# Patient Record
Sex: FEMALE | Race: BLACK OR AFRICAN AMERICAN | NOT HISPANIC OR LATINO | Employment: OTHER | URBAN - METROPOLITAN AREA
[De-identification: names, ages, dates, MRNs, and addresses within clinical notes are randomized per-mention and may not be internally consistent; named-entity substitution may affect disease eponyms.]

---

## 2022-12-22 ENCOUNTER — NURSE TRIAGE (OUTPATIENT)
Dept: ADMINISTRATIVE | Facility: CLINIC | Age: 47
End: 2022-12-22

## 2022-12-23 NOTE — TELEPHONE ENCOUNTER
Patient called to schedule a Gynecological appointment with the [facility] in [place].     Patient advised to contact the [facility] Scheduling Department to make a Specialty Provider appointment and advised that the O Triage Service is unable to schedule appointments with Specialty Providers. Patient provided with the phone information for scheduling ([phone]). Patient states understanding of care advice.      Reason for Disposition   Requesting regular office appointment    Additional Information   Negative: [1] Caller is not with the adult (patient) AND [2] reporting urgent symptoms   Negative: Lab result questions   Negative: Medication questions   Negative: Caller can't be reached by phone   Negative: Caller has already spoken to PCP or another triager   Negative: RN needs further essential information from caller in order to complete triage    Protocols used: Information Only Call-A-

## 2023-04-13 DIAGNOSIS — R19.00 ABDOMINAL MASS, UNSPECIFIED ABDOMINAL LOCATION: Primary | ICD-10-CM

## 2023-04-17 ENCOUNTER — TELEPHONE (OUTPATIENT)
Dept: HEMATOLOGY/ONCOLOGY | Facility: CLINIC | Age: 48
End: 2023-04-17
Payer: COMMERCIAL

## 2023-04-17 DIAGNOSIS — R19.00 ABDOMINAL MASS, UNSPECIFIED ABDOMINAL LOCATION: Primary | ICD-10-CM

## 2023-04-18 ENCOUNTER — LAB VISIT (OUTPATIENT)
Dept: LAB | Facility: HOSPITAL | Age: 48
End: 2023-04-18
Attending: STUDENT IN AN ORGANIZED HEALTH CARE EDUCATION/TRAINING PROGRAM
Payer: COMMERCIAL

## 2023-04-18 ENCOUNTER — TELEPHONE (OUTPATIENT)
Dept: SURGERY | Facility: CLINIC | Age: 48
End: 2023-04-18
Payer: COMMERCIAL

## 2023-04-18 DIAGNOSIS — R19.00 ABDOMINAL MASS, UNSPECIFIED ABDOMINAL LOCATION: ICD-10-CM

## 2023-04-18 PROCEDURE — 88321 PR  MICROSLIDE CONSULT: ICD-10-PCS | Mod: ,,, | Performed by: PATHOLOGY

## 2023-04-18 PROCEDURE — 88321 CONSLTJ&REPRT SLD PREP ELSWR: CPT | Performed by: PATHOLOGY

## 2023-04-18 PROCEDURE — 88321 CONSLTJ&REPRT SLD PREP ELSWR: CPT | Mod: ,,, | Performed by: PATHOLOGY

## 2023-04-20 ENCOUNTER — TELEPHONE (OUTPATIENT)
Dept: SURGERY | Facility: CLINIC | Age: 48
End: 2023-04-20
Payer: COMMERCIAL

## 2023-04-21 ENCOUNTER — TELEPHONE (OUTPATIENT)
Dept: SURGERY | Facility: CLINIC | Age: 48
End: 2023-04-21
Payer: COMMERCIAL

## 2023-04-24 ENCOUNTER — TELEPHONE (OUTPATIENT)
Dept: SURGERY | Facility: CLINIC | Age: 48
End: 2023-04-24
Payer: COMMERCIAL

## 2023-04-24 LAB
COMMENT: NORMAL
FINAL PATHOLOGIC DIAGNOSIS: NORMAL
Lab: NORMAL

## 2023-05-08 ENCOUNTER — TELEPHONE (OUTPATIENT)
Dept: SURGERY | Facility: CLINIC | Age: 48
End: 2023-05-08
Payer: COMMERCIAL

## 2023-05-09 ENCOUNTER — TELEPHONE (OUTPATIENT)
Dept: SURGERY | Facility: CLINIC | Age: 48
End: 2023-05-09
Payer: COMMERCIAL

## 2023-05-09 NOTE — TELEPHONE ENCOUNTER
----- Message from Luisa Phoenix RN sent at 5/9/2023  7:37 AM CDT -----    ----- Message -----  From: Facundo Aaron  Sent: 5/8/2023   6:15 PM CDT  To: Kymberly Reyes Staff    Type:  Patient Returning Call    Who Called:pt   Who Left Message for Patient:luis aaron  Does the patient know what this is regarding?:pt questions   Would the patient rather a call back or a response via [facility]? Call  Best Call Back Number:[phone]  Additional Information: pt called in regards to having some questions In regards to appts

## 2023-05-15 ENCOUNTER — OFFICE VISIT (OUTPATIENT)
Dept: SURGERY | Facility: CLINIC | Age: 48
End: 2023-05-15
Payer: COMMERCIAL

## 2023-05-15 VITALS
OXYGEN SATURATION: 98 % | BODY MASS INDEX: 20.88 KG/M2 | DIASTOLIC BLOOD PRESSURE: 76 MMHG | SYSTOLIC BLOOD PRESSURE: 149 MMHG | WEIGHT: 117.81 LBS | HEART RATE: 87 BPM | HEIGHT: 63 IN

## 2023-05-15 DIAGNOSIS — R19.09 ABDOMINAL WALL MASS OF SUPRAPUBIC REGION: ICD-10-CM

## 2023-05-15 PROCEDURE — 3078F DIAST BP <80 MM HG: CPT | Mod: CPTII,S$GLB,, | Performed by: STUDENT IN AN ORGANIZED HEALTH CARE EDUCATION/TRAINING PROGRAM

## 2023-05-15 PROCEDURE — 1159F MED LIST DOCD IN RCRD: CPT | Mod: CPTII,S$GLB,, | Performed by: STUDENT IN AN ORGANIZED HEALTH CARE EDUCATION/TRAINING PROGRAM

## 2023-05-15 PROCEDURE — 99205 OFFICE O/P NEW HI 60 MIN: CPT | Mod: S$GLB,,, | Performed by: STUDENT IN AN ORGANIZED HEALTH CARE EDUCATION/TRAINING PROGRAM

## 2023-05-15 PROCEDURE — 99205 PR OFFICE/OUTPT VISIT, NEW, LEVL V, 60-74 MIN: ICD-10-PCS | Mod: S$GLB,,, | Performed by: STUDENT IN AN ORGANIZED HEALTH CARE EDUCATION/TRAINING PROGRAM

## 2023-05-15 PROCEDURE — 3078F PR MOST RECENT DIASTOLIC BLOOD PRESSURE < 80 MM HG: ICD-10-PCS | Mod: CPTII,S$GLB,, | Performed by: STUDENT IN AN ORGANIZED HEALTH CARE EDUCATION/TRAINING PROGRAM

## 2023-05-15 PROCEDURE — 99999 PR PBB SHADOW E&M-EST. PATIENT-LVL IV: CPT | Mod: PBBFAC,,, | Performed by: STUDENT IN AN ORGANIZED HEALTH CARE EDUCATION/TRAINING PROGRAM

## 2023-05-15 PROCEDURE — 1160F PR REVIEW ALL MEDS BY PRESCRIBER/CLIN PHARMACIST DOCUMENTED: ICD-10-PCS | Mod: CPTII,S$GLB,, | Performed by: STUDENT IN AN ORGANIZED HEALTH CARE EDUCATION/TRAINING PROGRAM

## 2023-05-15 PROCEDURE — 3077F PR MOST RECENT SYSTOLIC BLOOD PRESSURE >= 140 MM HG: ICD-10-PCS | Mod: CPTII,S$GLB,, | Performed by: STUDENT IN AN ORGANIZED HEALTH CARE EDUCATION/TRAINING PROGRAM

## 2023-05-15 PROCEDURE — 1160F RVW MEDS BY RX/DR IN RCRD: CPT | Mod: CPTII,S$GLB,, | Performed by: STUDENT IN AN ORGANIZED HEALTH CARE EDUCATION/TRAINING PROGRAM

## 2023-05-15 PROCEDURE — 3077F SYST BP >= 140 MM HG: CPT | Mod: CPTII,S$GLB,, | Performed by: STUDENT IN AN ORGANIZED HEALTH CARE EDUCATION/TRAINING PROGRAM

## 2023-05-15 PROCEDURE — 1159F PR MEDICATION LIST DOCUMENTED IN MEDICAL RECORD: ICD-10-PCS | Mod: CPTII,S$GLB,, | Performed by: STUDENT IN AN ORGANIZED HEALTH CARE EDUCATION/TRAINING PROGRAM

## 2023-05-15 PROCEDURE — 99999 PR PBB SHADOW E&M-EST. PATIENT-LVL IV: ICD-10-PCS | Mod: PBBFAC,,, | Performed by: STUDENT IN AN ORGANIZED HEALTH CARE EDUCATION/TRAINING PROGRAM

## 2023-05-15 PROCEDURE — 3008F PR BODY MASS INDEX (BMI) DOCUMENTED: ICD-10-PCS | Mod: CPTII,S$GLB,, | Performed by: STUDENT IN AN ORGANIZED HEALTH CARE EDUCATION/TRAINING PROGRAM

## 2023-05-15 PROCEDURE — 3008F BODY MASS INDEX DOCD: CPT | Mod: CPTII,S$GLB,, | Performed by: STUDENT IN AN ORGANIZED HEALTH CARE EDUCATION/TRAINING PROGRAM

## 2023-05-15 RX ORDER — CYCLOBENZAPRINE HCL 5 MG
5 TABLET ORAL
COMMUNITY
Start: 2023-04-17

## 2023-05-15 RX ORDER — ONDANSETRON 4 MG/1
4 TABLET, FILM COATED ORAL
COMMUNITY
Start: 2023-02-13 | End: 2024-02-13

## 2023-05-15 RX ORDER — ACETAMINOPHEN AND DIPHENHYDRAMINE HYDROCHLORIDE 500; 25 MG/1; MG/1
1 TABLET, FILM COATED ORAL NIGHTLY PRN
COMMUNITY
Start: 2023-04-14

## 2023-05-15 RX ORDER — HYDROCODONE BITARTRATE AND ACETAMINOPHEN 7.5; 325 MG/1; MG/1
1 TABLET ORAL EVERY 6 HOURS PRN
COMMUNITY
Start: 2023-04-14

## 2023-05-15 RX ORDER — POTASSIUM CHLORIDE 750 MG/1
TABLET, FILM COATED, EXTENDED RELEASE ORAL EVERY MORNING
COMMUNITY
Start: 2023-02-10

## 2023-05-15 RX ORDER — OMEPRAZOLE 20 MG/1
20 CAPSULE, DELAYED RELEASE ORAL
COMMUNITY
Start: 2023-05-14 | End: 2023-06-13

## 2023-05-15 RX ORDER — LACTULOSE 10 G/15ML
20 SOLUTION ORAL; RECTAL 2 TIMES DAILY PRN
COMMUNITY
Start: 2023-02-13

## 2023-05-15 NOTE — PROGRESS NOTES
Surgical Oncology Clinic Note  [facility]       Referring Provider: [facility] *   PCP: Primary Doctor No    Reason For Visit: No chief complaint on file.      Oncologic History:  2022: CT AP - 11.2 x 11 x 5.4 cm irregular mass within the lower ventral abdominal wall at and below the level of the umbilicus comprised of enhancing soft tissue and 6.8 cm cystic component.    2/10/2023:  233 and CEA 25.9  3/9/2023: Biopsy - Consistent with adenocarcinoma  2023: EGD/colonoscopy - Gastric ulcers and no masses or polyps seen on colonoscopy although the prep was poor    History of Present Illness    Abram Amado is a 47 y.o. female with history of of a  in  and a laparoscopic tubal ligation shortly after who began developing pain and swelling at her incision in . It looked like a keloid scar until about 1.5 years ago when the area began swelling more and her pain worsened. She presented to the Emergency Room for evaluation in 2022 and a CT abdomen/pelvis was obtained which showed a large abdominal wall mass. A biopsy was subsequently obtained which was consistent with adenocarcinoma. She underwent an EGD and colonoscopy last month without identification of a primary malignancy.     She states that she has constant pain at the site for which she takes tylenol, Flexeril, and hydrocodone. The mass also started draining about a week ago and is foul smelling. She has lost about 10-15lbs in the past 1.5 years despite continuing to have a good appetite. She is able to walk but is limited secondary to pain. She was scheduled to have a PET scan at an outside facility but declined it. She states that she has seen a medical oncologist but she has not yet started chemotherapy.    Patient denies personal history of MI, CVA, lung disease, DM  Previous abdominal surgeries include:   Denies alcohol, tobacco, and elicit drug use.   Not currently on any  anticoagulants    Pathology:   FINAL GROSS AND MICROSCOPIC DIAGNOSIS Soft tissue, lower abdominal wall mass, excision:  - Consistent with adenocarcinoma, see comment    Comment: Sections show a core biopsy composed of fibrous and myxoid tissue infiltrated by irregular, angulated, and poorly-formed glands with nuclear atypia and mitoses. Immunohistochemical stains were performed; controls stained appropriately. Tumor cells are positive for pancytokeratin, CK7, CK20 (patchy), TTF-1 (patchy), and CDX2 (focal). HER2 shows partial membranous staining. Tumor is negative for NE, ER, PAX8, CD10, and GATA3. This immunoprofile is not specific. Site of origin possibilities include, but are not limited to gastric, pancreaticobiliary, other gastrointestinal, urachal, and others.        Staging: Cancer Staging   No matching staging information was found for the patient.     Medications  Tylenol  Hydrocodone  Flexeril    Review of patient's allergies indicates:  Not on File    PMHx:  Denies    PSHx:   2005  Tubal ligation    Fhx:  Mother: MI  Father: HTN  Sister: Melanoma    Social History     Socioeconomic History    Marital status:        Review of Systems   Constitutional:  Positive for weight loss. Negative for chills and fever.   Respiratory:  Negative for cough and shortness of breath.    Cardiovascular:  Negative for chest pain and palpitations.   Gastrointestinal:  Positive for abdominal pain and constipation. Negative for diarrhea, nausea and vomiting.   Musculoskeletal:  Positive for back pain.       There were no vitals filed for this visit.  There is no height or weight on file to calculate BMI.  ECOG SCORE           Physical Exam  Constitutional:       General: She is not in acute distress.     Appearance: She is normal weight. She is not ill-appearing or toxic-appearing.   HENT:      Head: Normocephalic and atraumatic.   Cardiovascular:      Rate and Rhythm: Normal rate.   Pulmonary:      Effort:  "Pulmonary effort is normal. No respiratory distress.   Abdominal:      Palpations: Abdomen is soft. There is mass.      Tenderness: There is abdominal tenderness (There is tenderness to palpation around the mass). There is no guarding or rebound.      Comments: There is a large mass extending from just inferior to the umbilicus to the mons pubis, taking up most of the lower abdomen. There are areas of skin erosion with a small amount of drainage.    Musculoskeletal:      Cervical back: Normal range of motion.   Skin:     General: Skin is warm and dry.      Coloration: Skin is not jaundiced.   Neurological:      Mental Status: She is alert. Mental status is at baseline.   Psychiatric:      Comments: Anxious appearing, pressured speech        DATA REVIEW:  I personally reviewed the following records for this visit: lab work from prior visit, notes from other physicians, surgical pathology results, endoscopy results, radiographic study evaluation, laboratory results    Lab Results   Component Value Date    WBC 8.0 05/13/2023    HGB 11.1 (L) 05/13/2023    HCT 34.6 (L) 05/13/2023     05/13/2023       Lab Results   Component Value Date    CEA 25.94 02/10/2023    : 233    Radiology:   CT ABD & PELVIS W CONTRAST    Narrative  EXAMINATION:  CT ABD & PELVIS WITH CONTRAST, 12/14/2022    CLINICAL HISTORY:  Generalized lower abdominal pain centered area of "scar tissue  following tubal ligation".    COMPARISON:  None available.    FINDINGS:  11.2 x 11 x 5.4 cm irregular mass within the lower ventral abdominal  wall at and below the level of the umbilicus comprised of enhancing  soft tissue and 6.8 cm cystic component. Stranding of the surrounding  fat and thickening of the overlying skin.    1.8 cm enhancing exophytic mass arising from the anterior uterine  fundus. No adnexal mass. Indeterminate borderline in size and small  bilateral inguinal lymph nodes. No enlarged lymph nodes within the  abdomen and pelvis by CT " size criteria. No acute vascular abnormality    Normal renal enhancement without hydronephrosis or opaque stone. No  abnormal perinephric fat stranding. No hydroureter. Urinary bladder  is patulous without focal CT abnormality. No filling defect within the  opacified portions of the collecting system on delayed phase.    2 small hemangiomas within hepatic segment VII. Spleen, pancreas, and  adrenal glands are normal. Gallbladder is partially contracted. No  biliary ductal dilatation.    No pneumoperitoneum or pneumatosis. No free intraperitoneal fluid. No  focal CT abnormality of the stomach allowing for limitations related to  relative underdistention. No CT evidence of acute bowel abnormality  allowing for limitations related to paucity of intra-abdominal fat and  relative bowel underdistention. No CT evidence of appendicitis.    No acute abnormality within the included lower thorax.    No acute or suspicious osseous abnormality. Advanced multilevel lower  lumbar degenerative disc disease and hypertrophic facet arthropathy.    Impression  IMPRESSION:  11.2 CM IRREGULAR TUMOR WITHIN THE LOWER VENTRAL ABDOMINAL WALL WITH  IRREGULAR ENHANCING SOFT TISSUE COMPONENT AND 6.8 CM CYSTIC COMPONENT.  MASS SHOULD BE AMENABLE TO ULTRASOUND-GUIDED TISSUE SAMPLING IF A  DIAGNOSIS HAS NOT ALREADY BEEN ESTABLISHED. DIAGNOSTIC CONSIDERATIONS  INCLUDE ALTHOUGH NOT LIMITED TO SARCOMA, METASTASIS, ENDOMETRIOSIS, AND  DESMOID TUMOR.    1.8 CM ENHANCING EXOPHYTIC MASS ARISING FROM THE ANTERIOR UTERINE  FUNDUS IS STATISTICALLY LIKELY A FIBROID ALTHOUGH OTHER ETIOLOGIES NOT  EXCLUDED.    ASSESSMENT & PLAN:  Abram Amado is a 47 y.o. female with a large lower abdominal mass eroding through the skin found to be adenocarcinoma of unknown origin and thus far her work up has not revealed a primary malignancy. It was discussed with the patient if the malignancy is locally advanced without evidence of metastatic disease then resection would  potentially be an option. However, if she does have does have metastatic disease she will need to undergo chemotherapy with possible radiation.     - Will order a full body FDG PET   - Disc with CT abdomen/Pelvis from 5/13/2023 obtained  - Follow up in 3 weeks to discuss results of the PET scan and management going forward    TONJA Hale MD   General Surgery

## 2023-05-16 ENCOUNTER — TELEPHONE (OUTPATIENT)
Dept: SURGERY | Facility: CLINIC | Age: 48
End: 2023-05-16
Payer: COMMERCIAL

## 2023-05-16 ENCOUNTER — DOCUMENTATION ONLY (OUTPATIENT)
Dept: SURGERY | Facility: CLINIC | Age: 48
End: 2023-05-16
Payer: COMMERCIAL

## 2023-05-16 ENCOUNTER — DOCUMENTATION ONLY (OUTPATIENT)
Dept: HEMATOLOGY/ONCOLOGY | Facility: CLINIC | Age: 48
End: 2023-05-16
Payer: COMMERCIAL

## 2023-05-16 DIAGNOSIS — R19.09 ABDOMINAL WALL MASS OF SUPRAPUBIC REGION: Primary | ICD-10-CM

## 2023-05-16 NOTE — TELEPHONE ENCOUNTER
Placed call to pt and was not able to leave a message on pt vm (pt not accepting call at this time)  Placed call to alternate number and spoke to pt father and informed him to ask pt to call our office. Telephone number provided.

## 2023-05-16 NOTE — PROGRESS NOTES
Spoke to Dwain FORREST and he will get in touch with pt for her [employer] reservation and gas reimbursement.

## 2023-05-16 NOTE — TELEPHONE ENCOUNTER
Returned pt call and informed pt her labs is pasha for  Friday 6/2/23 @ 9:20 am and PET scan @ 10:30 am and pt has to be at the facility 30 mins prior to her scan and NPO 6 hrs before. Facility address provided.   Informed pt she has an appt with Dr Traylor on Monday 6/5/23 @ 10;30 am  and our office is still working on trying to get pt in with Plastic & rad/onc appt.   Informed pt we will get SW to help pt to make reservation for [employer] accomodation.    Pt verbalized understanding all of the above.

## 2023-05-16 NOTE — PROGRESS NOTES
Social Work Consult    Name:Abram Amado  : 1975  MRN: [id number]    Referral:[place] [facility] FORREST received consult from Luisa Phoenix RN. Patient will need a [facility] stay -.     LON spoke to patient who confirmed need. She provided information needed for the Lodging request.     LON submit request to the [facility] via email and notified LuisaSubhash

## 2023-05-17 ENCOUNTER — DOCUMENTATION ONLY (OUTPATIENT)
Dept: HEMATOLOGY/ONCOLOGY | Facility: CLINIC | Age: 48
End: 2023-05-17
Payer: COMMERCIAL

## 2023-05-17 NOTE — PROGRESS NOTES
SW spoke to patient who is requesting resources to help cover the cost of food, rent/mortgage and utilities.     Patient also request SW to notify the [facility] that she has a negative covid test and wears a mask for [employer] reason. SW explained she will need to bring in proof of a negative test [facility]    Patient also had questions about reimbursement for lodging since she may have to bring additional people. LON explained that [facility] could not reimburse for additional hotel space. Patient request vouchers for near by lodging, SW encouraged her to speak to the [facility] about this and see if they can make an exception to the 2 person rule.    LON compiled an email containing food resources: SNAP, m[employer]ief (assists in applying for SNAP) and Division of Economic Assistance. LON also provided the numbers for the local and national ACS numbers.    LON will send a follow up email with further resources to assist with rent and utilities.

## 2023-05-18 ENCOUNTER — DOCUMENTATION ONLY (OUTPATIENT)
Dept: HEMATOLOGY/ONCOLOGY | Facility: CLINIC | Age: 48
End: 2023-05-18
Payer: COMMERCIAL

## 2023-05-18 NOTE — PROGRESS NOTES
LON received a call from Rafa at the [facility]. He explained that patient's situation is complicated. At this point patients caregiver while being hospitalized is Marina Mariee who has physical restrictions. Marina would be bringing her son who has intellectual deficits. Rafa explained he's concerned about 3 people requiring a caregiver staying at the [place] and if [facility].    LON spoke to Marina who confirmed the above. LON stressed that stay would only be for 5 nights, if patient's hospitalization is extended then she and her son would still need to leave the [facility] and find alt lodging.    LON confirmed with supervisor Fallon Garcia LCSW that [facility] funds could be used to cover this stay at [facility].    LON emailed [facility] Cost Transfer form with reservation dates to [email or web address]

## 2023-05-19 ENCOUNTER — DOCUMENTATION ONLY (OUTPATIENT)
Dept: HEMATOLOGY/ONCOLOGY | Facility: CLINIC | Age: 48
End: 2023-05-19
Payer: COMMERCIAL

## 2023-05-19 ENCOUNTER — DOCUMENTATION ONLY (OUTPATIENT)
Dept: SURGERY | Facility: CLINIC | Age: 48
End: 2023-05-19
Payer: COMMERCIAL

## 2023-05-19 ENCOUNTER — TELEPHONE (OUTPATIENT)
Dept: SURGERY | Facility: CLINIC | Age: 48
End: 2023-05-19
Payer: COMMERCIAL

## 2023-05-19 NOTE — PROGRESS NOTES
LON received confirmation number from the [facility] for the 6/1-6/6 stay, Conf# [id number].     LON notified the [facility] that lodging for this patient is no longer needed.    LON provided Marina with confirmation number and phone number for [facility].

## 2023-05-19 NOTE — TELEPHONE ENCOUNTER
Placed call to pt and left a message on pt vm to call back regarding her appts with Dr Traylor and all the other speciality doctors.   Telephone number provided.

## 2023-05-22 ENCOUNTER — TELEPHONE (OUTPATIENT)
Dept: SURGERY | Facility: CLINIC | Age: 48
End: 2023-05-22
Payer: COMMERCIAL

## 2023-05-22 ENCOUNTER — TUMOR BOARD CONFERENCE (OUTPATIENT)
Dept: SURGERY | Facility: CLINIC | Age: 48
End: 2023-05-22
Payer: COMMERCIAL

## 2023-05-22 NOTE — TELEPHONE ENCOUNTER
Returned pt call and informed pt her Thursday 6/1/23 pt appt with Plastic Dr walton is @ 3 pm  Friday 6/2/23 Blood work @ 9:20 am and PET scan   @ 10 am  Monday 6/5/23 with Rad/onc Dr Dawn @ 9 am and Dr Traylor @ 10:30 am  Facility address provided.  Informed pt to call our office if she has any questions or concerns. Telephone number  Pt verbalized understanding all of the above

## 2023-05-22 NOTE — TELEPHONE ENCOUNTER
Placed call to pt and left a message on pt vm to call back.  Placed call to pt alternate number and spoke to pt father and left a message with him asking pt to call our office regarding her appts. Telephone number provided.  Pt father  verbalizing understanding.

## 2023-05-22 NOTE — PROGRESS NOTES
[facility] UGI MULTIDISCIPLINARY TUMOR BOARD  PATIENT REVIEW FORM   ____________________________________________________________    CLINIC #: [id number]  DATE: 5/22/2023    DIAGNOSIS: abd wall KELLIE    PRESENTER: Pointer    PATIENT SUMMARY:   This 46 y/o female underwent tubal ligation about 15 years ago. Now she has developed a necrotic, fungating, draining abd mass.   Reviewed imaging from 5/2023 compared to 12/2022, anterior abd wall mass enlarging to 7.5cm x 12.2cm, 6.1cm cystic component, noting inguinal lymphadenopathy bilaterally.   Outside pathology was reviewed here. Gland forming adenocarcinoma with TTF, CK7 and CK20 immunostaining positive.    BOARD RECOMMENDATIONS:   Obtain PET for staging  Pending rad onc consult and plastic surgery  Plan for surgical resection +/- neoadj radiation    CONSULT NEEDED:     [x] Plastic Surgery    [] Hem/Onc    [x] Rad/Onc    [] Dietary            [] Social Service    [] Psychology       [] AES  [] Radiology     Clinical Stage: Tumor  Node(s)  Metastasis       GROUP STAGE:  [] O    [] 1A    [] IB    [] IIA    [] IIB     [] IIIA     [] IIIB     [] IIIC    []IV  [] Local recurrence     [] Regional recurrence     [] Distant recurrence   Metastatic site(s): none         [x] [facility] Treatment Guidelines reviewed and care planned is consistent with guidelines.         (i.e., NCCN, NCI, PD, ACO, AUA, etc.)    PRESENTATION AT CANCER CONFERENCE:         [x] Pros

## 2023-05-23 ENCOUNTER — TELEPHONE (OUTPATIENT)
Dept: SURGERY | Facility: CLINIC | Age: 48
End: 2023-05-23
Payer: COMMERCIAL

## 2023-05-23 DIAGNOSIS — R19.09 ABDOMINAL WALL MASS OF SUPRAPUBIC REGION: Primary | ICD-10-CM

## 2023-05-23 NOTE — TELEPHONE ENCOUNTER
Placed call to pt @ [phone] and informed pt her PET scan at [facility] facility has been denied for out of network. Informed pt her PET scan has been pasha at [facility] on Wednesday 5/31/23 @ 12:45 pm and pt has to be at the facility @ 11;45 am and fasting 4 hours prior to scan. Facility address: [address] [place] [address] [place] [address]. Telephone [phone].  Informed pt to bring her PET scan CD disk with her for her appt with Dr Traylor.  Pt verbalized understanding all of the above. Informed pt to call our office if she has any questions or concerns. Telephone number provided.

## 2023-05-24 ENCOUNTER — TELEPHONE (OUTPATIENT)
Dept: SURGERY | Facility: CLINIC | Age: 48
End: 2023-05-24
Payer: COMMERCIAL

## 2023-05-24 NOTE — TELEPHONE ENCOUNTER
----- Message from Luisa Phoenix RN sent at 5/24/2023  3:55 PM CDT -----  Regarding: FW: requesting return call from Luisa; PET scan  Contact: PT @ [phone]  Pt is returning call. She is confused about all this insurance stuff.   Please reach out to pt.    Martha Moran  ----- Message -----  From: Shana Whitfield RN  Sent: 5/24/2023   3:49 PM CDT  To: Luisa Phoenix RN  Subject: FW: requesting return call from Luisa; PET scan      ----- Message -----  From: Kamla Anguiano  Sent: 5/24/2023   3:46 PM CDT  To: Felisa CAVAZOS Staff  Subject: requesting return call from Luisa; PET scan        Pt called to speak with someone in the office regarding her PET scan. Please contact pt. Thanks.

## 2023-05-24 NOTE — TELEPHONE ENCOUNTER
Received call from pt stating someone from our office call and left a message. Informed her admin dept and  Oncology navigator have been trying to call her regarding her insurance. Informed her I will forward her message to them.

## 2023-05-24 NOTE — TELEPHONE ENCOUNTER
Patient informs me that the PET scheduled at [facility] is not in her insurance network and it is on hold for now.  I explained that she is not in [facility] network nor does she have out of network benefits.  I encouraged her to call Ms Meliton from our financial counseling division to see if she would qualify for financial assistance.  She verbalizes understanding of the above.

## 2023-05-25 ENCOUNTER — TELEPHONE (OUTPATIENT)
Dept: SURGERY | Facility: CLINIC | Age: 48
End: 2023-05-25
Payer: COMMERCIAL

## 2023-05-25 NOTE — TELEPHONE ENCOUNTER
Returned call to Remberto from [facility] @ [phone]. She is requesting pre auth number/status for pt PET scan that has been pasha at [facility] for 3/31/23. Informed her I have send a message to the pre auth dept and they are still working on it.

## 2023-05-25 NOTE — TELEPHONE ENCOUNTER
----- Message from Coco Stock sent at 5/25/2023 10:20 AM CDT -----  Regarding: PET Authorization  Contact: Mabel @ [phone]  Mabel from [facility] is calling to get authorization for a Pet scan. Asking for an urgent call back

## 2023-05-29 ENCOUNTER — TELEPHONE (OUTPATIENT)
Dept: SURGERY | Facility: CLINIC | Age: 48
End: 2023-05-29
Payer: COMMERCIAL

## 2023-05-29 NOTE — TELEPHONE ENCOUNTER
----- Message from Luisa Phoenix RN sent at 5/29/2023  4:41 PM CDT -----  Regarding: FW: Procedure info  Contact: [phone]    ----- Message -----  From: Yolande Zarco  Sent: 5/29/2023   4:35 PM CDT  To: Kymberly Reyes Staff  Subject: Procedure info                                   Calling in regards to speaking with Elle. Pt was told to give this info  Authorization number: [id number]  Procedure: Maligant overlapping sides colon procedure  Date: pre approval was on 5/26/2023  Please call pt you back to confirm

## 2023-05-29 NOTE — TELEPHONE ENCOUNTER
"Discussed with patient the information provided by our Financial Counselors, Mgr Medley and her team.  The patient does not qualify for financial assistance and her insurance is out of network for our facility and she does not have out of network benefits.  She verbalizes understanding of this.  I have encouraged her to reach out to Evie to see if [facility], [facility] or [facility] in [facility] have any Surgical Oncologists who are in their network.  She has asked if our  team could call her to see if the  Cancer Society has any available options for a patient in her situation.  I will see if someone from that team can call her to discuss.  She understands that if she moves forward with her appointments scheduled 6/1 - 6/5 that she would fall into a "self pay" category.  She wishes to keep all of her appts as they are scheduled.  I will call her tomorrow afternoon to see if she has any updates.    "

## 2023-05-29 NOTE — TELEPHONE ENCOUNTER
----- Message from Luisa Phoenix RN sent at 5/29/2023  4:41 PM CDT -----  Regarding: FW: Procedure info  Contact: [phone]    ----- Message -----  From: Yolande Zarco  Sent: 5/29/2023   4:35 PM CDT  To: Kymberly Reyes Staff  Subject: Procedure info                                   Calling in regards to speaking with Elle. Pt was told to give this info  Authorization number: [id number]  Procedure: Maligant overlapping sides colon procedure  Date: pre approval was on 5/26/2023  Please call pt you back to confirm

## 2023-05-30 ENCOUNTER — TELEPHONE (OUTPATIENT)
Dept: HEMATOLOGY/ONCOLOGY | Facility: CLINIC | Age: 48
End: 2023-05-30
Payer: COMMERCIAL

## 2023-05-30 ENCOUNTER — TELEPHONE (OUTPATIENT)
Dept: SURGERY | Facility: CLINIC | Age: 48
End: 2023-05-30
Payer: COMMERCIAL

## 2023-05-30 ENCOUNTER — DOCUMENTATION ONLY (OUTPATIENT)
Dept: HEMATOLOGY/ONCOLOGY | Facility: CLINIC | Age: 48
End: 2023-05-30
Payer: COMMERCIAL

## 2023-05-30 ENCOUNTER — DOCUMENTATION ONLY (OUTPATIENT)
Dept: SURGERY | Facility: CLINIC | Age: 48
End: 2023-05-30
Payer: COMMERCIAL

## 2023-05-30 NOTE — TELEPHONE ENCOUNTER
Spoke to Lady and give her pt pre auth # [id number] for her Pet Scan at [facility].

## 2023-05-30 NOTE — PROGRESS NOTES
Received confirmation for successful fax transmission [phone]  Received confirmation for successful fax transmission [phone]  Received confirmation for successful fax transmission   [phone]

## 2023-05-30 NOTE — PROGRESS NOTES
LON spoke to Elle Aaron RN. Patient's insurance is not accepted, patient is not eligible for FA since she is not a resident of [place] or [place].    SW spoke to patient and explained that [facility] is out of network and could not provide treatment/surgery. Patient explained that she had an auth from [employer]. SW explained that no auth had been requested. She also explained that she had an auth for a PET scan in [facility]. Patient asked how much [employer] would assist, SW explained they could cover some utility bills but not the cost of treatment. Patient asked what plans [facility] does accept, if Dr. Traylor could make an exception and accept out of network insurance. SW explained that best option is to call the number on the back of her [employer] card to find in network oncology surgeons.     Patient initially stated that she has lodging arranged for the procedure so she will just come. SW explained that the procedure wouldn't be happening. LON emailed [facility] to cancel reservation.    LON attempted to compile a list of onc/surgeons but [employer] is not available in [place] or [place] and [place] may be the closest option, unable to create a list of onc/surgeons.    LON called and left a VM recommending patient call the number on the back of her insurance card and ask for an in network oncology surgeon as well as providing direct call back number and request a call back.

## 2023-05-30 NOTE — TELEPHONE ENCOUNTER
----- Message from Adry Wilson sent at 5/30/2023  8:07 AM CDT -----  Regarding: fax order  Susu brown/ [facility]  calling stated they need Orders (message) for PET CT SCAN.  call back [phone] and fax # [phone].  She stated pt is coming in the morning she want to know if you can fax the order over STAT

## 2023-05-30 NOTE — TELEPHONE ENCOUNTER
"Spoke with patient this morning regarding the continued struggles with her insurance coverage and need for treatment. Patient and I discussed that Evie is out of network for [facility], and we are trying to find an in-network location that can provide her the care she needs.     Discussed with patient her current scheduled appt list. She states she would like to keep these consults since she has organized lodging and "I trust Dr. Traylor". Stressed to patient that these visits will likely not be covered by Evie and she will be 100% responsible for the cost. She states "I have been self pay before and I'm ok with it" Also discussed that, regardless of whether or not she proceeds with the visits, we unfortunately cannot perform surgery or radiation here with her out of network status. The out of pocket cost for her would be too great.     Patient states she understands she cannot have treatment at [facility]. SW is trying to assist her in finding an in-network provider, and she would like to take the recommendations made in these consults to whatever provider she can see for treatment. She states she would prefer to get the surgery in [place] if possible, so if there is an in -network surgeon in this region that would be her preference.     Told patient we would continue to keep her informed as we move forward. She verbalized understanding to all information.     Updated provider team, financial team, social work team, and navigation team on my conversation with patient   "

## 2023-05-30 NOTE — TELEPHONE ENCOUNTER
Cassidy angelica call @ [phone]  and informed her our office  have already faxed over pt orders several times to fax number [phone], [phone] and [phone] since 5/23/23 and have received confirmation for successful fax transmission. Informed her our office will be more than happy to fax it over again.

## 2023-05-31 ENCOUNTER — DOCUMENTATION ONLY (OUTPATIENT)
Dept: HEMATOLOGY/ONCOLOGY | Facility: CLINIC | Age: 48
End: 2023-05-31
Payer: COMMERCIAL

## 2023-05-31 ENCOUNTER — TELEPHONE (OUTPATIENT)
Dept: SURGERY | Facility: CLINIC | Age: 48
End: 2023-05-31
Payer: COMMERCIAL

## 2023-05-31 NOTE — TELEPHONE ENCOUNTER
----- Message from Jayden Oro sent at 5/31/2023 11:56 AM CDT -----  Regarding: adv  Contact: [phone]  Pt calling to speak with gibson lambert call and adv@[phone]

## 2023-06-01 ENCOUNTER — DOCUMENTATION ONLY (OUTPATIENT)
Dept: HEMATOLOGY/ONCOLOGY | Facility: CLINIC | Age: 48
End: 2023-06-01
Payer: COMMERCIAL

## 2023-06-01 ENCOUNTER — OFFICE VISIT (OUTPATIENT)
Dept: PLASTIC SURGERY | Facility: CLINIC | Age: 48
End: 2023-06-01
Payer: COMMERCIAL

## 2023-06-01 VITALS — SYSTOLIC BLOOD PRESSURE: 140 MMHG | HEART RATE: 108 BPM | DIASTOLIC BLOOD PRESSURE: 92 MMHG

## 2023-06-01 DIAGNOSIS — R19.09 ABDOMINAL WALL MASS OF SUPRAPUBIC REGION: ICD-10-CM

## 2023-06-01 PROCEDURE — 3080F DIAST BP >= 90 MM HG: CPT | Mod: CPTII,S$GLB,, | Performed by: SURGERY

## 2023-06-01 PROCEDURE — 3077F SYST BP >= 140 MM HG: CPT | Mod: CPTII,S$GLB,, | Performed by: SURGERY

## 2023-06-01 PROCEDURE — 99203 OFFICE O/P NEW LOW 30 MIN: CPT | Mod: S$GLB,,, | Performed by: SURGERY

## 2023-06-01 PROCEDURE — 99999 PR PBB SHADOW E&M-EST. PATIENT-LVL III: ICD-10-PCS | Mod: PBBFAC,,, | Performed by: SURGERY

## 2023-06-01 PROCEDURE — 3080F PR MOST RECENT DIASTOLIC BLOOD PRESSURE >= 90 MM HG: ICD-10-PCS | Mod: CPTII,S$GLB,, | Performed by: SURGERY

## 2023-06-01 PROCEDURE — 99999 PR PBB SHADOW E&M-EST. PATIENT-LVL III: CPT | Mod: PBBFAC,,, | Performed by: SURGERY

## 2023-06-01 PROCEDURE — 99203 PR OFFICE/OUTPT VISIT, NEW, LEVL III, 30-44 MIN: ICD-10-PCS | Mod: S$GLB,,, | Performed by: SURGERY

## 2023-06-01 PROCEDURE — 3077F PR MOST RECENT SYSTOLIC BLOOD PRESSURE >= 140 MM HG: ICD-10-PCS | Mod: CPTII,S$GLB,, | Performed by: SURGERY

## 2023-06-01 NOTE — PROGRESS NOTES
LON received a call from the patient. She inquired into where she needed to check in for her plastic surgery appointment. LON notified her that it was the [facility] 2nd floor. Patient explained that she would be late and request that SW notify staff. She expects to be there 45 min after scheduled time. LON notified Dr. Ricardo Davis and staff and request they call the patient if rescheduling is needed.

## 2023-06-02 ENCOUNTER — DOCUMENTATION ONLY (OUTPATIENT)
Dept: HEMATOLOGY/ONCOLOGY | Facility: CLINIC | Age: 48
End: 2023-06-02
Payer: COMMERCIAL

## 2023-06-02 ENCOUNTER — LAB VISIT (OUTPATIENT)
Dept: LAB | Facility: HOSPITAL | Age: 48
End: 2023-06-02
Attending: STUDENT IN AN ORGANIZED HEALTH CARE EDUCATION/TRAINING PROGRAM
Payer: COMMERCIAL

## 2023-06-02 DIAGNOSIS — R19.09 ABDOMINAL WALL MASS OF SUPRAPUBIC REGION: ICD-10-CM

## 2023-06-02 LAB
ALBUMIN SERPL BCP-MCNC: 3.2 G/DL (ref 3.5–5.2)
ALP SERPL-CCNC: 119 U/L (ref 55–135)
ALT SERPL W/O P-5'-P-CCNC: 16 U/L (ref 10–44)
ANION GAP SERPL CALC-SCNC: 10 MMOL/L (ref 8–16)
AST SERPL-CCNC: 23 U/L (ref 10–40)
BASOPHILS # BLD AUTO: 0.02 K/UL (ref 0–0.2)
BASOPHILS NFR BLD: 0.3 % (ref 0–1.9)
BILIRUB SERPL-MCNC: 0.2 MG/DL (ref 0.1–1)
BUN SERPL-MCNC: 11 MG/DL (ref 6–20)
CALCIUM SERPL-MCNC: 8.7 MG/DL (ref 8.7–10.5)
CANCER AG125 SERPL-ACNC: 759 U/ML (ref 0–30)
CANCER AG19-9 SERPL-ACNC: 778.6 U/ML (ref 0–40)
CEA SERPL-MCNC: 208.4 NG/ML (ref 0–5)
CHLORIDE SERPL-SCNC: 101 MMOL/L (ref 95–110)
CO2 SERPL-SCNC: 24 MMOL/L (ref 23–29)
CREAT SERPL-MCNC: 0.8 MG/DL (ref 0.5–1.4)
DIFFERENTIAL METHOD: ABNORMAL
EOSINOPHIL # BLD AUTO: 0.1 K/UL (ref 0–0.5)
EOSINOPHIL NFR BLD: 2.1 % (ref 0–8)
ERYTHROCYTE [DISTWIDTH] IN BLOOD BY AUTOMATED COUNT: 14.7 % (ref 11.5–14.5)
EST. GFR  (NO RACE VARIABLE): >60 ML/MIN/1.73 M^2
GLUCOSE SERPL-MCNC: 100 MG/DL (ref 70–110)
HCT VFR BLD AUTO: 28.2 % (ref 37–48.5)
HGB BLD-MCNC: 9.3 G/DL (ref 12–16)
IMM GRANULOCYTES # BLD AUTO: 0.02 K/UL (ref 0–0.04)
IMM GRANULOCYTES NFR BLD AUTO: 0.3 % (ref 0–0.5)
INR PPP: 1 (ref 0.8–1.2)
LYMPHOCYTES # BLD AUTO: 1.6 K/UL (ref 1–4.8)
LYMPHOCYTES NFR BLD: 27.7 % (ref 18–48)
MCH RBC QN AUTO: 27.4 PG (ref 27–31)
MCHC RBC AUTO-ENTMCNC: 33 G/DL (ref 32–36)
MCV RBC AUTO: 83 FL (ref 82–98)
MONOCYTES # BLD AUTO: 0.4 K/UL (ref 0.3–1)
MONOCYTES NFR BLD: 6.9 % (ref 4–15)
NEUTROPHILS # BLD AUTO: 3.6 K/UL (ref 1.8–7.7)
NEUTROPHILS NFR BLD: 62.7 % (ref 38–73)
NRBC BLD-RTO: 0 /100 WBC
PLATELET # BLD AUTO: 218 K/UL (ref 150–450)
PMV BLD AUTO: 9 FL (ref 9.2–12.9)
POTASSIUM SERPL-SCNC: 3.7 MMOL/L (ref 3.5–5.1)
PROT SERPL-MCNC: 7.3 G/DL (ref 6–8.4)
PROTHROMBIN TIME: 11.1 SEC (ref 9–12.5)
RBC # BLD AUTO: 3.39 M/UL (ref 4–5.4)
SODIUM SERPL-SCNC: 135 MMOL/L (ref 136–145)
WBC # BLD AUTO: 5.81 K/UL (ref 3.9–12.7)

## 2023-06-02 PROCEDURE — 86304 IMMUNOASSAY TUMOR CA 125: CPT | Performed by: STUDENT IN AN ORGANIZED HEALTH CARE EDUCATION/TRAINING PROGRAM

## 2023-06-02 PROCEDURE — 80053 COMPREHEN METABOLIC PANEL: CPT | Performed by: STUDENT IN AN ORGANIZED HEALTH CARE EDUCATION/TRAINING PROGRAM

## 2023-06-02 PROCEDURE — 82378 CARCINOEMBRYONIC ANTIGEN: CPT | Performed by: STUDENT IN AN ORGANIZED HEALTH CARE EDUCATION/TRAINING PROGRAM

## 2023-06-02 PROCEDURE — 85025 COMPLETE CBC W/AUTO DIFF WBC: CPT | Performed by: STUDENT IN AN ORGANIZED HEALTH CARE EDUCATION/TRAINING PROGRAM

## 2023-06-02 PROCEDURE — 85610 PROTHROMBIN TIME: CPT | Performed by: STUDENT IN AN ORGANIZED HEALTH CARE EDUCATION/TRAINING PROGRAM

## 2023-06-02 PROCEDURE — 86301 IMMUNOASSAY TUMOR CA 19-9: CPT | Performed by: STUDENT IN AN ORGANIZED HEALTH CARE EDUCATION/TRAINING PROGRAM

## 2023-06-02 PROCEDURE — 36415 COLL VENOUS BLD VENIPUNCTURE: CPT | Performed by: STUDENT IN AN ORGANIZED HEALTH CARE EDUCATION/TRAINING PROGRAM

## 2023-06-02 NOTE — PROGRESS NOTES
SW received 4 VM's after hours from patient.    SW returned call, patient is requesting a $50 gas card and report that the [facility] charged them $50, patient felt this may be an incidental fee.    LON compiled and signed out two $25 cards and spoke to staff at the [facility] who confirmed the $50 was an incidental fee and would not be charged.     Patient will call LON once labs are completed to meet and pickup the gas cards.

## 2023-06-05 ENCOUNTER — DOCUMENTATION ONLY (OUTPATIENT)
Dept: HEMATOLOGY/ONCOLOGY | Facility: CLINIC | Age: 48
End: 2023-06-05
Payer: COMMERCIAL

## 2023-06-05 ENCOUNTER — OFFICE VISIT (OUTPATIENT)
Dept: SURGERY | Facility: CLINIC | Age: 48
End: 2023-06-05
Payer: COMMERCIAL

## 2023-06-05 ENCOUNTER — OFFICE VISIT (OUTPATIENT)
Dept: RADIATION ONCOLOGY | Facility: CLINIC | Age: 48
End: 2023-06-05
Payer: COMMERCIAL

## 2023-06-05 VITALS
TEMPERATURE: 98 F | SYSTOLIC BLOOD PRESSURE: 136 MMHG | HEIGHT: 62 IN | OXYGEN SATURATION: 97 % | DIASTOLIC BLOOD PRESSURE: 95 MMHG | BODY MASS INDEX: 20.65 KG/M2 | HEART RATE: 120 BPM | WEIGHT: 112.19 LBS

## 2023-06-05 VITALS — BODY MASS INDEX: 20.52 KG/M2 | HEIGHT: 62 IN

## 2023-06-05 DIAGNOSIS — C80.1 ADENOCARCINOMA OF UNKNOWN PRIMARY: ICD-10-CM

## 2023-06-05 DIAGNOSIS — R19.09 ABDOMINAL WALL MASS OF SUPRAPUBIC REGION: ICD-10-CM

## 2023-06-05 DIAGNOSIS — C79.89 METASTATIC ADENOCARCINOMA INVOLVING SOFT TISSUE WITH UNKNOWN PRIMARY SITE: Primary | ICD-10-CM

## 2023-06-05 DIAGNOSIS — C80.1 ADENOCARCINOMA OF UNKNOWN PRIMARY: Primary | ICD-10-CM

## 2023-06-05 DIAGNOSIS — C80.1 METASTATIC ADENOCARCINOMA INVOLVING SOFT TISSUE WITH UNKNOWN PRIMARY SITE: Primary | ICD-10-CM

## 2023-06-05 PROCEDURE — 3075F PR MOST RECENT SYSTOLIC BLOOD PRESS GE 130-139MM HG: ICD-10-PCS | Mod: CPTII,S$GLB,, | Performed by: RADIOLOGY

## 2023-06-05 PROCEDURE — 99999 PR PBB SHADOW E&M-EST. PATIENT-LVL III: CPT | Mod: PBBFAC,,, | Performed by: RADIOLOGY

## 2023-06-05 PROCEDURE — 3008F BODY MASS INDEX DOCD: CPT | Mod: CPTII,S$GLB,, | Performed by: RADIOLOGY

## 2023-06-05 PROCEDURE — 99215 OFFICE O/P EST HI 40 MIN: CPT | Mod: S$GLB,,, | Performed by: STUDENT IN AN ORGANIZED HEALTH CARE EDUCATION/TRAINING PROGRAM

## 2023-06-05 PROCEDURE — 3075F SYST BP GE 130 - 139MM HG: CPT | Mod: CPTII,S$GLB,, | Performed by: RADIOLOGY

## 2023-06-05 PROCEDURE — 1160F PR REVIEW ALL MEDS BY PRESCRIBER/CLIN PHARMACIST DOCUMENTED: ICD-10-PCS | Mod: CPTII,S$GLB,, | Performed by: STUDENT IN AN ORGANIZED HEALTH CARE EDUCATION/TRAINING PROGRAM

## 2023-06-05 PROCEDURE — 99215 PR OFFICE/OUTPT VISIT, EST, LEVL V, 40-54 MIN: ICD-10-PCS | Mod: S$GLB,,, | Performed by: STUDENT IN AN ORGANIZED HEALTH CARE EDUCATION/TRAINING PROGRAM

## 2023-06-05 PROCEDURE — 99999 PR PBB SHADOW E&M-EST. PATIENT-LVL III: ICD-10-PCS | Mod: PBBFAC,,, | Performed by: RADIOLOGY

## 2023-06-05 PROCEDURE — 99999 PR PBB SHADOW E&M-EST. PATIENT-LVL II: ICD-10-PCS | Mod: PBBFAC,,, | Performed by: STUDENT IN AN ORGANIZED HEALTH CARE EDUCATION/TRAINING PROGRAM

## 2023-06-05 PROCEDURE — 1159F MED LIST DOCD IN RCRD: CPT | Mod: CPTII,S$GLB,, | Performed by: STUDENT IN AN ORGANIZED HEALTH CARE EDUCATION/TRAINING PROGRAM

## 2023-06-05 PROCEDURE — 99999 PR PBB SHADOW E&M-EST. PATIENT-LVL II: CPT | Mod: PBBFAC,,, | Performed by: STUDENT IN AN ORGANIZED HEALTH CARE EDUCATION/TRAINING PROGRAM

## 2023-06-05 PROCEDURE — 1160F RVW MEDS BY RX/DR IN RCRD: CPT | Mod: CPTII,S$GLB,, | Performed by: STUDENT IN AN ORGANIZED HEALTH CARE EDUCATION/TRAINING PROGRAM

## 2023-06-05 PROCEDURE — 99205 PR OFFICE/OUTPT VISIT, NEW, LEVL V, 60-74 MIN: ICD-10-PCS | Mod: S$GLB,,, | Performed by: RADIOLOGY

## 2023-06-05 PROCEDURE — 3080F DIAST BP >= 90 MM HG: CPT | Mod: CPTII,S$GLB,, | Performed by: RADIOLOGY

## 2023-06-05 PROCEDURE — 3008F PR BODY MASS INDEX (BMI) DOCUMENTED: ICD-10-PCS | Mod: CPTII,S$GLB,, | Performed by: RADIOLOGY

## 2023-06-05 PROCEDURE — 1159F PR MEDICATION LIST DOCUMENTED IN MEDICAL RECORD: ICD-10-PCS | Mod: CPTII,S$GLB,, | Performed by: STUDENT IN AN ORGANIZED HEALTH CARE EDUCATION/TRAINING PROGRAM

## 2023-06-05 PROCEDURE — 3080F PR MOST RECENT DIASTOLIC BLOOD PRESSURE >= 90 MM HG: ICD-10-PCS | Mod: CPTII,S$GLB,, | Performed by: RADIOLOGY

## 2023-06-05 PROCEDURE — 99205 OFFICE O/P NEW HI 60 MIN: CPT | Mod: S$GLB,,, | Performed by: RADIOLOGY

## 2023-06-05 PROCEDURE — 3008F BODY MASS INDEX DOCD: CPT | Mod: CPTII,S$GLB,, | Performed by: STUDENT IN AN ORGANIZED HEALTH CARE EDUCATION/TRAINING PROGRAM

## 2023-06-05 PROCEDURE — 3008F PR BODY MASS INDEX (BMI) DOCUMENTED: ICD-10-PCS | Mod: CPTII,S$GLB,, | Performed by: STUDENT IN AN ORGANIZED HEALTH CARE EDUCATION/TRAINING PROGRAM

## 2023-06-05 RX ORDER — MORPHINE SULFATE 15 MG/1
15 TABLET, FILM COATED, EXTENDED RELEASE ORAL 2 TIMES DAILY PRN
COMMUNITY
Start: 2023-05-31

## 2023-06-05 RX ORDER — LORAZEPAM 0.5 MG/1
0.5 TABLET ORAL EVERY 6 HOURS PRN
COMMUNITY
Start: 2023-05-26

## 2023-06-05 NOTE — PROGRESS NOTES
Plastic Surgery History & Physical    SUBJECTIVE:   Chief complaint:  Abdominal wall mass    History of Present Illness:  47 y.o. female with a slowly growing mass of her abdominal wall.  It was fungating through the skin.  She thinks that it has been growing for the last 16 years.  She had clips placed on her fallopian tubes have since been recalled.  She thinks that this might be the cause of her adenocarcinoma.  She referenced Dr. Sanjuanita Peña when discussing what needed to be done and I think lichen this extra patient more to a cyst removal.  I had explained to meet her own words what she understood from Dr. Traylor 1 needs to be done.  She knew that the tumor needed to be removed and that some other doctors would be involved.  The patient denies other medical problems.  Past surgical history includes tubal ligation and .  She does not smoke.    No past medical history on file.    Past Surgical History:   Procedure Laterality Date     SECTION         No family history on file.    Social History     Socioeconomic History    Marital status:    Tobacco Use    Smoking status: Never    Smokeless tobacco: Never       Current Outpatient Medications   Medication Sig Dispense Refill    cyclobenzaprine (FLEXERIL) 5 MG tablet Take 5 mg by mouth.      diphenhydrAMINE-acetaminophen (TYLENOL PM EXTRA STRENGTH)  mg Tab Take 1 tablet by mouth nightly as needed.      HYDROcodone-acetaminophen (NORCO) 7.5-325 mg per tablet Take 1 tablet by mouth every 6 (six) hours as needed.      KLOR-CON 10 10 mEq TbSR Take by mouth every morning.      lactulose (CHRONULAC) 10 gram/15 mL solution Take 20 g by mouth 2 (two) times daily as needed.      omeprazole (PRILOSEC) 20 MG capsule Take 20 mg by mouth.      ondansetron (ZOFRAN) 4 MG tablet Take 4 mg by mouth.      LORazepam (ATIVAN) 0.5 MG tablet Take 0.5 mg by mouth every 6 (six) hours as needed.      morphine (MS CONTIN) 15 MG 12 hr tablet Take 15 mg by  mouth 2 (two) times daily as needed.       No current facility-administered medications for this visit.       Review of patient's allergies indicates:  No Known Allergies          OBJECTIVE:     BP (!) 140/92   Pulse 108   LMP 05/29/2023 (Approximate)       Physical Exam:  Gen: NAD, appears stated age  Neuro: normal without focal findings, mental status and speech normal  HEENT: NCAT, neck supple, PEERL  CV: RRR  Pulm: Breathing non-labored, chest wall movement equal bilaterally   Breast: not examined  Abdomen: soft, 1 tender, abdominal wall mass from the umbilicus to above the pubic bone involving about 2/3 the width of her abdomen  Gu: genitalia not examined  Extremity:normal strength, no cyanosis or edema  Skin: see above  Psych: flat affect    Recent CT scans reviewed      ASSESSMENT/PLAN:     Abdominal wall adenocarcinoma of suprapubic region  Had a long discussion about her pathology and surgical needs.  The patient voiced her understanding that she had to have a large tumor removed.  I do not think she clot understood the morbidity of this and what would be left behind.  Regarding her reconstruction, she would need a large underlay bridging mesh with pedicled fascial flap coverage this could be from both or either thigh.  She would also need skin coverage and we would bring skin with this.  I told her that she would need enough skin to reconstruct this that I do not think the wound to be closed primarily and would require skin grafting to her thigh.  We discussed the possible weakness with leg extension, abdominal wall weakness, and risk of bulge and hernia.  The patient was also concerned because she thinks she needs a hysterectomy and salpingectomy because of clips and that she had placed on her tubes.  Informed her that she should not plan on doing this after her abdominal wall reconstruction as that could jeopardize her abdominal wall integrity.  Would also risks infection to the mesh.  I think anything  she needs done should be done at the same time with the plan of never having to go back into her abdomen again.  The patient voiced her understanding.      I think this would be a very significant operation.  I think she would be in the hospital over a week.  She would certainly need a ICU admission postoperatively.  The patient quite got the full magnitude of her diagnosis and recovery.  A PET scan has been ordered by Dr. Traylor.      It is my [facility] was my recommendation for her reconstruction.    Broderick Davis, DO  Plastic and Reconstructive Surgery    This includes face to face time and non-face to face time preparing to see the patient (eg, review of tests), obtaining and/or reviewing separately obtained history, documenting clinical information in the electronic or other health record, independently interpreting results and communicating results to the patient/family/caregiver, or care coordinator.

## 2023-06-05 NOTE — PROGRESS NOTES
"LON met with patient and one of her caregivers, Zurdo.    SW provided $50 gas card, patient was very appreciative. Patient asked about financial assistance for the current trip to [place] in addition to the $50 gas card and full payment for the [facility]. SW inquired into other costs associated with the trip, she explained that "bills from home don't stop when you're away." LON attempted to clarify if rent or utilities assistance is needed. Patient again asked about resources that cover home costs when away. Zurdo politely interrupted to ask for any financial assistance available. He provided his email, [email or web address] in addition to patients.     LON forwarded initial email resent to patient and Zurdo. This includes local ACS contact, SNAP assistance and Division of Economic Assistance. New email included:  Utilities  -[facility] [employer] [facility] - [phone]  -[facility] - [phone]  -Low-Income Household Water Assistance Program   -Low Income Home Energy Assistance Program    Rent  -Rental resources are hard to come by. Please ask [employer] as well as the Division of Economic Assistance.  -[employer]    Email to Zurdo did not go through for the initial email nor [email or web address]. LON sent email to patient requesting she forward it to Zurdo.    "

## 2023-06-05 NOTE — PROGRESS NOTES
PATIENT IDENTIFICATION:  Patient Name: Abram Amado  MRN: [id number]  : 1975    DIAGNOSIS:  Cancer Staging   No matching staging information was found for the patient.    HISTORY OF PRESENT ILLNESS:   The patient is a 47 year old woman with a metastastic adenocarcinoma involving the abdominal wall.  She presents today for recommendations regarding management.    The patient reports that she developed a lesion at the site of her tubal ligation scar approximately 2 years ago.  She presented to the ED with pain at the site on 2022.  CT scan of the abdomen performed on 2022 revealed an 11.2 cm mass within the lower ventral abdominal wall with a 6.86 cm cystic component.  Biopsy of the mass was performed on 3/7/23 revealing an adenocarcinoma.  She underwent an upper and lower endoscopy without evidence of a primary tumor.  The patient underwent PET scan on 2023 which again revealed a mass measuring 13.5 x 10.6 x 7.4 cm.  Maximum SUV of the mass was 14.4.  Satellite lesions were present more cephalad measuring approximately 2 cm.  There was arron uptake along the left pelvic sidewall with SUV of 6.8 and left iliac chain uptake with SUV of 6.0.  There were multiple hypermetabolic arron mass is present in the left and right groins.    The patient was seen by Dr. Micah Gonzalez and recommended treatment with FOLFOX and Avastin.  The patient has been reluctant to pursue this regimen as she is concerned about toxicity related to systemic therapy.  She wants upfront surgery.    Oncology History   Adenocarcinoma of unknown primary   2023 Initial Diagnosis    Adenocarcinoma of unknown primary     2023 Tumor Markers    CA-125  - 759  CA 19-9  - 778.6  CEA  - 208.4         REVIEW OF SYSTEMS:   Review of Systems   Constitutional:  Positive for malaise/fatigue and weight loss. Negative for fever.   HENT:  Positive for hearing loss. Negative for ear pain, sinus pain and sore throat.    Eyes:  Negative for  blurred vision, double vision and pain.        Dry eye   Respiratory:  Negative for cough, hemoptysis, shortness of breath and wheezing.    Cardiovascular:  Negative for chest pain, palpitations and leg swelling.   Gastrointestinal:  Positive for abdominal pain, constipation and nausea. Negative for blood in stool, diarrhea, heartburn and vomiting.   Genitourinary:  Negative for dysuria, frequency, hematuria and urgency.   Musculoskeletal:  Positive for back pain. Negative for joint pain and neck pain.   Skin:  Negative for itching and rash.   Neurological:  Positive for headaches. Negative for tingling, focal weakness and seizures.   Psychiatric/Behavioral:  Negative for depression. The patient is nervous/anxious.        PAST MEDICAL HISTORY:  No past medical history on file.    PAST SURGICAL HISTORY:  No past surgical history on file.    ALLERGIES:   Review of patient's allergies indicates:  No Known Allergies    MEDICATIONS:  Current Outpatient Medications   Medication Sig    cyclobenzaprine (FLEXERIL) 5 MG tablet Take 5 mg by mouth.    diphenhydrAMINE-acetaminophen (TYLENOL PM EXTRA STRENGTH)  mg Tab Take 1 tablet by mouth nightly as needed.    HYDROcodone-acetaminophen (NORCO) 7.5-325 mg per tablet Take 1 tablet by mouth every 6 (six) hours as needed.    KLOR-CON 10 10 mEq TbSR Take by mouth every morning.    lactulose (CHRONULAC) 10 gram/15 mL solution Take 20 g by mouth 2 (two) times daily as needed.    omeprazole (PRILOSEC) 20 MG capsule Take 20 mg by mouth.    ondansetron (ZOFRAN) 4 MG tablet Take 4 mg by mouth.    LORazepam (ATIVAN) 0.5 MG tablet Take 0.5 mg by mouth every 6 (six) hours as needed.    morphine (MS CONTIN) 15 MG 12 hr tablet Take 15 mg by mouth 2 (two) times daily as needed.     No current facility-administered medications for this visit.       SOCIAL HISTORY:  Social History     Socioeconomic History    Marital status:    Tobacco Use    Smoking status: Never    Smokeless  tobacco: Never       FAMILY HISTORY:  No family history on file.      PHYSICAL EXAMINATION:  ECO  Vitals:    23 0927   BP: (!) 136/95   Pulse: (!) 120   Temp: 98.4 °F (36.9 °C)     Body mass index is 20.52 kg/m².    Physical Exam  Constitutional:       Appearance: Normal appearance.   HENT:      Head: Normocephalic and atraumatic.      Nose: Nose normal.      Mouth/Throat:      Mouth: Mucous membranes are moist.   Cardiovascular:      Rate and Rhythm: Normal rate.   Pulmonary:      Effort: Pulmonary effort is normal.   Abdominal:      General: Abdomen is flat.      Palpations: Abdomen is soft.      Comments: Bulky 12 cm exophytic mass at the anterior abdominal wall   Musculoskeletal:         General: Normal range of motion.      Cervical back: Normal range of motion.   Skin:     General: Skin is warm and dry.   Neurological:      General: No focal deficit present.      Mental Status: She is alert. Mental status is at baseline.     Picture taken 5/15/23      RADIOLOGY:  Thorax: There is a subtle ground-glass opacity measuring up to 8 mm in   the posterior aspect of the right upper lobe, axial slice 85 of series   3.  It shows SUVs of 2.0 there is a focal area of BG uptake in anterior   left upper lobe on axial slice 92 of series 3.  No discrete nodularity   is identified in this region.  The SUVs are 2.7. the heart and   mediastinal structures are normal.  There is no adenopathy present.     Abdomen and pelvis: Large solid heterogeneous lobulated neoplastic mass   the abdominal extends from the subumbilical region down to the pubis.   It measures roughly 13.5 x 10.6 x 7.4 cm.  Maximum SUVs are 14.4.  A   more cephalad midline satellite lesions also present.  Measuring   approximately 2 cm.  Activity in the pelvis likely represents activity   within the urinary bladder lumen.  Layla uptake along the left pelvic   sidewall noted on axial slice 220 of series 3 noted with SUVs of 6.8.   More cephalad left  iliac chain uptake noted with SUVs of 6.0.  multiple   hypermetabolic arron masses are present in the left and right groin.   No suspicious uptake in the liver, pancreas, spleen, or kidneys.      ASSESSMENT/PLAN:  Abram was seen today for consult.    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  She has a large lesion located at the abdominal wall with evidence of disease involving the left iliac chain and inguinal nodes.    Given the extent of disease, the patient is recommended upfront chemotherapy to shrink/down stage the tumor and to determine intent of treatment based on response.   Up until this point, the patient has been very reluctant to proceed with chemotherapy as she is concerned about the harm that chemotherapy will cause the normal tissues.  I explained to her that chemotherapy is the only modality that can safely address all of her tumor at this time.  Radiation would not be recommended at this time given the extent of her disease as fields would need to include the abdominal wall, groin and pelvis.  If she responds favorably to chemotherapy then could consider radiation to a dose of 45 Gy prior to surgery or patient could go directly to surgery.  If she progresses on chemotherapy then treatment intent would be purely palliative in nature.    I spent approximately 60 minutes reviewing the available records and evaluating the patient, out of which over 50% of the time was spent face to face with the patient in counseling and coordinating this patient's care.

## 2023-06-06 ENCOUNTER — TELEPHONE (OUTPATIENT)
Dept: SURGERY | Facility: CLINIC | Age: 48
End: 2023-06-06
Payer: COMMERCIAL

## 2023-06-06 ENCOUNTER — DOCUMENTATION ONLY (OUTPATIENT)
Dept: HEMATOLOGY/ONCOLOGY | Facility: CLINIC | Age: 48
End: 2023-06-06
Payer: COMMERCIAL

## 2023-06-06 NOTE — PROGRESS NOTES
"Luisa Gan RN, called SW. She is unsure how to approach situation. Patient wants MD to request emergency authorization.    SW spoke to patient who explained "Dr. Traylor said he wanted to help, I'm giving you a number ([phone]) to call for him to get the authorization to help me." LON explained that Dr. Traylor mentioned receiving radiation prior to surgery. Patient stated she was told she could get it afterwards and doesn't want treatment at [facility], just the surgery because she wants the best. Dr. Traylor's staff notified of patient's request.         "

## 2023-06-06 NOTE — PROGRESS NOTES
Surgical Oncology Clinic Note  [facility] [[email or web address]]       Referring Provider: Angélica Anguiano MD  PCP: Unknown    Reason For Visit: Abdominal wall adenocarcinoma    Oncologic History:  2022: CT AP - 11.2 x 11 x 5.4 cm irregular mass within the lower ventral abdominal wall at and below the level of the umbilicus comprised of enhancing soft tissue and 6.8 cm cystic component.    2/10/2023:  233 and CEA 25.9  3/9/2023: Biopsy - Consistent with adenocarcinoma  2023: EGD/colonoscopy - Gastric ulcers and no masses or polyps seen on colonoscopy although the prep was poor  2023: PET/CT - Hypermetabolic primary abdominal wall lesion, iliac chain and inguinal nodes.     History of Present Illness     Abram Amado is a 47 y.o. female with history of of a  in  and a laparoscopic tubal ligation shortly after who began developing pain and swelling at her incision in . It looked like a keloid scar until about 1.5 years ago when the area began swelling more and her pain worsened. She presented to the Emergency Room for evaluation in 2022 and a CT abdomen/pelvis was obtained which showed a large abdominal wall mass. A biopsy was subsequently obtained which was consistent with adenocarcinoma. She underwent an EGD and colonoscopy last month without identification of a primary malignancy.      She states that she has constant pain at the site for which she takes tylenol, Flexeril, and hydrocodone. The mass also started draining about a week ago and is foul smelling. She has lost about 10-15lbs in the past 1.5 years despite continuing to have a good appetite. She is able to walk but is limited secondary to pain. She was scheduled to have a PET scan at an outside facility but declined it. She states that she has seen a medical oncologist but she has not yet started chemotherapy.     Patient denies personal history of MI, CVA, lung disease, DM  Previous abdominal surgeries include:    Denies alcohol, tobacco, and elicit drug use.   Not currently on any anticoagulants    Interval Since Last Visit:    2023: Abram Amado returns today for follow-up after undergoing PET CT and discussion in our multidisciplinary tumor board. She is doing ok and eager to get this lesion addressed. Unfortunately there has been difficulty getting her care approved by Evie at [facility]. They will not allow any diagnostic imaging or definitive treatment to be delivered here. She is here to discuss our recommendations and discuss a referral to a multidisciplinary center that is within her network.    Pathology:   FINAL GROSS AND MICROSCOPIC DIAGNOSIS Soft tissue, lower abdominal wall mass, excision:  - Consistent with adenocarcinoma, see comment    Comment: Sections show a core biopsy composed of fibrous and myxoid tissue infiltrated by irregular, angulated, and poorly-formed glands with nuclear atypia and mitoses. Immunohistochemical stains were performed; controls stained appropriately. Tumor cells are positive for pancytokeratin, CK7, CK20 (patchy), TTF-1 (patchy), and CDX2 (focal). HER2 shows partial membranous staining. Tumor is negative for WV, ER, PAX8, CD10, and GATA3. This immunoprofile is not specific. Site of origin possibilities include, but are not limited to gastric, pancreaticobiliary, other gastrointestinal, urachal, and others.          Current Outpatient Medications:     cyclobenzaprine (FLEXERIL) 5 MG tablet, Take 5 mg by mouth., Disp: , Rfl:     diphenhydrAMINE-acetaminophen (TYLENOL PM EXTRA STRENGTH)  mg Tab, Take 1 tablet by mouth nightly as needed., Disp: , Rfl:     HYDROcodone-acetaminophen (NORCO) 7.5-325 mg per tablet, Take 1 tablet by mouth every 6 (six) hours as needed., Disp: , Rfl:     KLOR-CON 10 10 mEq TbSR, Take by mouth every morning., Disp: , Rfl:     lactulose (CHRONULAC) 10 gram/15 mL solution, Take 20 g by mouth 2 (two) times daily as needed., Disp: , Rfl:      LORazepam (ATIVAN) 0.5 MG tablet, Take 0.5 mg by mouth every 6 (six) hours as needed., Disp: , Rfl:     morphine (MS CONTIN) 15 MG 12 hr tablet, Take 15 mg by mouth 2 (two) times daily as needed., Disp: , Rfl:     omeprazole (PRILOSEC) 20 MG capsule, Take 20 mg by mouth., Disp: , Rfl:     ondansetron (ZOFRAN) 4 MG tablet, Take 4 mg by mouth., Disp: , Rfl:     Review of patient's allergies indicates:  No Known Allergies     PMHx:  Denies     PSHx:     Tubal ligation     Fhx:  Mother: MI  Father: HTN  Sister: Melanoma    Past Surgical History:   Procedure Laterality Date     SECTION         History reviewed. No pertinent family history.    Social History     Socioeconomic History    Marital status:    Tobacco Use    Smoking status: Never    Smokeless tobacco: Never       Review of Systems   Constitutional:  Negative for chills, fever and weight loss.   HENT:  Negative for congestion and sore throat.    Eyes:  Negative for blurred vision.   Respiratory:  Negative for cough and shortness of breath.    Cardiovascular:  Negative for chest pain and leg swelling.   Gastrointestinal:  Positive for abdominal pain. Negative for constipation, diarrhea, heartburn, nausea and vomiting.   Genitourinary:  Negative for frequency and urgency.   Musculoskeletal:  Negative for back pain and joint pain.   Skin:  Negative for itching and rash.        Lesion/mass abdomen   Neurological:  Negative for weakness and headaches.   Psychiatric/Behavioral:  Positive for depression. The patient is nervous/anxious.        There were no vitals filed for this visit.  Body mass index is 20.52 kg/m².    Constitutional:       General: She is not in acute distress.     Appearance: She is normal weight. She is not ill-appearing or toxic-appearing.   HENT:      Head: Normocephalic and atraumatic.   Cardiovascular:      Rate and Rhythm: Normal rate.   Pulmonary:      Effort: Pulmonary effort is normal. No respiratory  distress.   Abdominal:      Palpations: Abdomen is soft. There is mass.      Tenderness: There is abdominal tenderness (There is tenderness to palpation around the mass). There is no guarding or rebound.      Comments: There is a large mass extending from just inferior to the umbilicus to the mons pubis, taking up most of the lower abdomen. There are areas of skin erosion with a small amount of drainage.    Musculoskeletal:      Cervical back: Normal range of motion.   Skin:     General: Skin is warm and dry.      Coloration: Skin is not jaundiced.   Neurological:      Mental Status: She is alert. Mental status is at baseline.   Psychiatric:      Comments: Anxious appearing, pressured speech       Lab Results   Component Value Date    WBC 5.81 06/02/2023    HGB 9.3 (L) 06/02/2023    HCT 28.2 (L) 06/02/2023     06/02/2023    ALT 16 06/02/2023    AST 23 06/02/2023     (L) 06/02/2023    K 3.7 06/02/2023     06/02/2023    CREATININE 0.8 06/02/2023    BUN 11 06/02/2023    CO2 24 06/02/2023    INR 1.0 06/02/2023       Lab Results   Component Value Date    .4 (H) 06/02/2023     778.6 (H) 06/02/2023     759 (H) 06/02/2023        Radiology:  EXAMINATION:   NM PET/CT SKULL BASE TO MID THIGH, 5/31/2023     CLINICAL HISTORY:   46 y/o Female; Generalized abdominal or pelvic swelling or mass or   lump.  Neoplasm of the colon.   Patient presents for initial PET assessment.     COMPARISON:   CT abdomen and pelvis with IV contrast from 04/13/2023     TECHNIQUE:   10.65 mCi of F-18 FDG were administered. Coincident imaging and CT   imaging was performed from the skull base to the upper thighs. Fusion   images were generated. Independent workstation analysis was performed.     FINDINGS:   Findings: The background mediastinal SUVs are 2.0, background liver   SUVs are 2.7.  Blood glucose level was 97 mg/dL.  Uptake time was 66   minutes.     Neck: Visualized portions of the brain are normal.  The  paranasal   sinuses are clear.  The trachea is midline and patent.  There is no   cervical adenopathy.     Thorax: There is a subtle ground-glass opacity measuring up to 8 mm in   the posterior aspect of the right upper lobe, axial slice 85 of series   3.  It shows SUVs of 2.0 there is a focal area of BG uptake in anterior   left upper lobe on axial slice 92 of series 3.  No discrete nodularity   is identified in this region.  The SUVs are 2.7. the heart and   mediastinal structures are normal.  There is no adenopathy present.     Abdomen and pelvis: Large solid heterogeneous lobulated neoplastic mass   the abdominal extends from the subumbilical region down to the pubis.   It measures roughly 13.5 x 10.6 x 7.4 cm.  Maximum SUVs are 14.4.  A   more cephalad midline satellite lesions also present.  Measuring   approximately 2 cm.  Activity in the pelvis likely represents activity   within the urinary bladder lumen.  Layla uptake along the left pelvic   sidewall noted on axial slice 220 of series 3 noted with SUVs of 6.8.   More cephalad left iliac chain uptake noted with SUVs of 6.0.  multiple   hypermetabolic layla masses are present in the left and right groin.   No suspicious uptake in the liver, pancreas, spleen, or kidneys.     Skeleton: The bony structures are within normal limits.    IMPRESSION:   1. Large heterogeneous neoplastic mass invading the lower abdominal   wall extending to the skin with measurements as given above.   Hypermetabolic layla mass is also present along the left pelvic   sidewall and left external iliac chain.  Bilateral inguinal adenopathy   with uptake also present.     All CT scans at this institution use dose modulation, iterative   reconstruction, and weight based dosing when clinically appropriate to   reduce radiation dose to as low as reasonably achievable.     Exam End: 05/31/23 14:22          ASSESSMENT & PLAN:  1. Adenocarcinoma of unknown primary    2. Abdominal wall  adenocarcinoma of suprapubic region       Abram Amado is a 47 y.o. female who presents today with a fungating lower abdominal mass, biopsy proven adenocarcinoma of unknown primary.  She presents today to discuss a treatment plan in regards to her abdominal wall adenocarcinoma.  She has seen Dr. Davis with Plastic surgery and Dr. Solorio with Radiation Oncology prior to our visit today.  PET revealed possible arron involvement in iliac and inguinal nodes. Our recommendation at this time is to proceed with neoadjuvant radiation, possible chemotherapy followed by definitive surgery for resection and abdominal wall reconstruction. Unfortunately her current insurance will not approve her definitive care here at [facility] as we are out of network. We have worked with case management and social work for a solution but she is not eligible for financial assistance or Medicaid at this time.    Ms. Amado is aware of these issues and wishes to be referred to a center where she can receive similar care. I plan to refer her to [place] with all recommendations and records so she may resume her care there. [facility] with [facility] approval at that center.      Follow-up: I will refer her to [facility] in [place] as her current insurance will not approve her care here at [facility].                  Eric Traylor Jr., MD              Surgical Oncology              [facility] [place], [place]              Office: [phone]              Fax: [phone]    Communications: 45 minutes were spent on today's visit in face-to-face and non face-to-face time with the patient. This patient was diagnosed with adenocarcinoma of unknown primary and time was required to provide counseling and guidance regarding their diagnosis. Time was spent reviewing additional new or outside records and information pertaining to their work-up, treatment and/or surveillance in  order to formulate a treatment plan in line with standardized guidelines.

## 2023-06-06 NOTE — TELEPHONE ENCOUNTER
"----- Message from Dwain Glynn LCSW sent at 6/6/2023 11:07 AM CDT -----  I spoke to patient again, absolutely no progress. She's saying she has a 1-800 number to request emergency authorization from [facility] for the procedure. I explained that she may need radiation prior to the procedure but she reports she was told she could get it any time. She doesn't want treatment here, just the surgery.    She kept saying "If you don't want to help me I get it, but Dr. Traylor said he wants to help so I gave him a number to call for emergency authorization." She also told me that she would go to [facility] here if she can get care at [facility]. I asked why she wouldn't go some place close to home and she said that she just wants the best care.         It looks like Micah Adkins MD is her oncologist at [facility]. I'm going to talk to Julissa, my supervisor about this and reach out to Dr. Adkins.    "

## 2023-06-06 NOTE — PROGRESS NOTES
6/5-LON received call from patient. She explained that she has reached out to [facility] for emergency authorization for surgery. SW requested patient keep him updated. Patient then discussed the [employer] helping out with the cost as well as [facility].    LON explained that [facility] may provide a few hundred dollars for bills or transportation but not for the cost of a surgery/treatment. LON also explained that [facility] does have FA but that she was screened already and will not meet criteria. LON explained the best and safest option would be to call the number on the back of her insurance card to find an oncologist within her network.    LON notified Dr. Traylor and staff of patient's request for emergency auth. Dr. Traylor was not aware of request and explained that he will refer the patient to [facility] in [place], [place] because patient will need radiation before surgery and chemo afterwards. Referral will be made on 6/6.

## 2023-06-06 NOTE — TELEPHONE ENCOUNTER
Returned call to pt yesterday and this morning and left a message on her vm to call back. Telephone number provided.

## 2023-06-06 NOTE — TELEPHONE ENCOUNTER
"----- Message from Liza Davis sent at 6/5/2023  2:54 PM CDT -----  Regarding: Authorization Form  "Type:  Patient Call Back    Who Called:PT    What is the reqeust in detail:Requesting call back in regards to appointment she had today pt was told to call once she speak with insurance pt would like to get this done today. Please advise    Can the clinic reply by [facility]?no    Best Call Back Number:[phone]      Additional Information:[employer] [phone] [email or web address]             "

## 2023-06-14 ENCOUNTER — TELEPHONE (OUTPATIENT)
Dept: HEMATOLOGY/ONCOLOGY | Facility: CLINIC | Age: 48
End: 2023-06-14
Payer: COMMERCIAL

## 2023-06-14 DIAGNOSIS — R19.00 ABDOMINAL MASS, UNSPECIFIED ABDOMINAL LOCATION: Primary | ICD-10-CM

## 2023-06-16 DIAGNOSIS — R19.00 ABDOMINAL MASS, UNSPECIFIED ABDOMINAL LOCATION: Primary | ICD-10-CM
